# Patient Record
Sex: MALE | Race: OTHER | HISPANIC OR LATINO | Employment: FULL TIME | URBAN - METROPOLITAN AREA
[De-identification: names, ages, dates, MRNs, and addresses within clinical notes are randomized per-mention and may not be internally consistent; named-entity substitution may affect disease eponyms.]

---

## 2018-03-19 ENCOUNTER — APPOINTMENT (OUTPATIENT)
Dept: LAB | Age: 23
End: 2018-03-19
Attending: PREVENTIVE MEDICINE

## 2018-03-19 ENCOUNTER — APPOINTMENT (OUTPATIENT)
Dept: RADIOLOGY | Age: 23
End: 2018-03-19
Attending: PREVENTIVE MEDICINE

## 2018-03-19 ENCOUNTER — TRANSCRIBE ORDERS (OUTPATIENT)
Dept: ADMINISTRATIVE | Age: 23
End: 2018-03-19

## 2018-03-19 DIAGNOSIS — Z02.1 PRE-EMPLOYMENT HEALTH SCREENING EXAMINATION: ICD-10-CM

## 2018-03-19 DIAGNOSIS — Z02.1 PRE-EMPLOYMENT HEALTH SCREENING EXAMINATION: Primary | ICD-10-CM

## 2018-03-19 PROCEDURE — 84202 ASSAY RBC PROTOPORPHYRIN: CPT

## 2018-03-19 PROCEDURE — 71045 X-RAY EXAM CHEST 1 VIEW: CPT

## 2018-03-19 PROCEDURE — 36415 COLL VENOUS BLD VENIPUNCTURE: CPT

## 2018-03-19 PROCEDURE — 86870 RBC ANTIBODY IDENTIFICATION: CPT

## 2018-03-23 LAB
FEP BLD-MCNC: 45 UG/DL (ref 0–100)
LEAD BLD-MCNC: 2 UG/DL (ref 0–19)
ZPP RBC-MCNC: 50 UG/DL (ref 0–100)

## 2019-03-13 ENCOUNTER — APPOINTMENT (OUTPATIENT)
Dept: RADIOLOGY | Age: 24
End: 2019-03-13
Attending: PREVENTIVE MEDICINE

## 2019-03-13 ENCOUNTER — TRANSCRIBE ORDERS (OUTPATIENT)
Dept: ADMINISTRATIVE | Age: 24
End: 2019-03-13

## 2019-03-13 ENCOUNTER — APPOINTMENT (OUTPATIENT)
Dept: LAB | Age: 24
End: 2019-03-13
Attending: PREVENTIVE MEDICINE

## 2019-03-13 DIAGNOSIS — Z00.8 HEALTH EXAMINATION IN POPULATION SURVEY: Primary | ICD-10-CM

## 2019-03-13 DIAGNOSIS — Z00.8 HEALTH EXAMINATION IN POPULATION SURVEY: ICD-10-CM

## 2019-03-13 PROCEDURE — 84202 ASSAY RBC PROTOPORPHYRIN: CPT

## 2019-03-13 PROCEDURE — 71045 X-RAY EXAM CHEST 1 VIEW: CPT

## 2019-03-13 PROCEDURE — 86870 RBC ANTIBODY IDENTIFICATION: CPT

## 2019-03-13 PROCEDURE — 36415 COLL VENOUS BLD VENIPUNCTURE: CPT

## 2019-03-15 LAB
LEAD BLD-MCNC: 4 UG/DL (ref 0–4)
ZPP RBC-MCNC: 42 UG/DL (ref 0–99)

## 2020-06-16 ENCOUNTER — OFFICE VISIT (OUTPATIENT)
Dept: URGENT CARE | Facility: CLINIC | Age: 25
End: 2020-06-16
Payer: OTHER GOVERNMENT

## 2020-06-16 VITALS
WEIGHT: 215 LBS | TEMPERATURE: 98.6 F | RESPIRATION RATE: 16 BRPM | BODY MASS INDEX: 31.84 KG/M2 | HEIGHT: 69 IN | OXYGEN SATURATION: 98 % | HEART RATE: 72 BPM

## 2020-06-16 DIAGNOSIS — R05.9 COUGH: Primary | ICD-10-CM

## 2020-06-16 PROCEDURE — 99213 OFFICE O/P EST LOW 20 MIN: CPT | Performed by: PHYSICIAN ASSISTANT

## 2020-06-16 PROCEDURE — U0003 INFECTIOUS AGENT DETECTION BY NUCLEIC ACID (DNA OR RNA); SEVERE ACUTE RESPIRATORY SYNDROME CORONAVIRUS 2 (SARS-COV-2) (CORONAVIRUS DISEASE [COVID-19]), AMPLIFIED PROBE TECHNIQUE, MAKING USE OF HIGH THROUGHPUT TECHNOLOGIES AS DESCRIBED BY CMS-2020-01-R: HCPCS | Performed by: PHYSICIAN ASSISTANT

## 2020-06-17 LAB — SARS-COV-2 RNA SPEC QL NAA+PROBE: DETECTED

## 2020-06-18 ENCOUNTER — TELEPHONE (OUTPATIENT)
Dept: OTHER | Facility: OTHER | Age: 25
End: 2020-06-18

## 2020-06-19 ENCOUNTER — TELEPHONE (OUTPATIENT)
Dept: URGENT CARE | Facility: CLINIC | Age: 25
End: 2020-06-19

## 2025-07-02 ENCOUNTER — OFFICE VISIT (OUTPATIENT)
Age: 30
End: 2025-07-02

## 2025-07-02 VITALS
HEIGHT: 69 IN | OXYGEN SATURATION: 96 % | RESPIRATION RATE: 18 BRPM | BODY MASS INDEX: 35.56 KG/M2 | SYSTOLIC BLOOD PRESSURE: 144 MMHG | HEART RATE: 90 BPM | WEIGHT: 240.08 LBS | DIASTOLIC BLOOD PRESSURE: 94 MMHG | TEMPERATURE: 97.9 F

## 2025-07-02 DIAGNOSIS — S01.01XA SCALP LACERATION, INITIAL ENCOUNTER: Primary | ICD-10-CM

## 2025-07-02 PROCEDURE — 12002 RPR S/N/AX/GEN/TRNK2.6-7.5CM: CPT

## 2025-07-02 PROCEDURE — 90715 TDAP VACCINE 7 YRS/> IM: CPT

## 2025-07-02 PROCEDURE — 99202 OFFICE O/P NEW SF 15 MIN: CPT

## 2025-07-02 RX ORDER — CEPHALEXIN 500 MG/1
500 CAPSULE ORAL EVERY 8 HOURS SCHEDULED
Qty: 21 CAPSULE | Refills: 0 | Status: SHIPPED | OUTPATIENT
Start: 2025-07-02 | End: 2025-07-09

## 2025-07-02 NOTE — PATIENT INSTRUCTIONS
Thank you for trusting your health with [facility].    Please review the following instructions and return to our clinic or consider an Emergency Department visit for worsening or severe symptoms.      Instructions:     Keep area clean and dry for 24 hours.    Then you can shower but avoid irritating the incision.    Do not pick at or remove staples on your own.    Return in 1 week for a wound check, they are usually removed in 7-10 days.

## 2025-07-02 NOTE — PROGRESS NOTES
[facility]  Name: Jackson Mcgovern      : 1995      MRN: [id number]  Encounter Provider: Nat Squires PA-C  Encounter Date: 2025   Encounter department: [facility] [place]  :  Assessment & Plan  Scalp laceration, initial encounter    Orders:    cephalexin (KEFLEX) 500 mg capsule; Take 1 capsule (500 mg total) by mouth every 8 (eight) hours for 7 days    TDAP VACCINE GREATER THAN OR EQUAL TO 6YO IM      Keflex sent due to depth of wound and prevention of infection development. TDAP given in office. 6 staples placed to close laceration without difficulty.  Advised pt to return in 7 days for a wound check.    Laceration repair    Date/Time: 2025 5:25 PM    Performed by: Nat Squires PA-C  Authorized by: Nat Squires PA-C  Body area: head/neck  Location details: scalp  Laceration length: 7 cm  Foreign bodies: no foreign bodies  Tendon involvement: none    Wound Dehiscence:  Superficial Wound Dehiscence: simple closure      Procedure Details:  Irrigation solution: saline  Irrigation method: syringe  Skin closure: staples  Number of sutures: 6  Approximation: close  Approximation difficulty: simple  Patient tolerance: patient tolerated the procedure well with no immediate complications          Patient Instructions  Follow up with PCP in 3-5 days.  Proceed to  ER if symptoms worsen.    If tests are performed, our office will contact you with results only if changes need to made to the care plan discussed with you at the visit. [facility]    Chief Complaint:   Chief Complaint   Patient presents with    Head Laceration     About 2 hours ago hit head on edge of table about one inch above hair line on forehead. Took shower, still bleeding slightly. Denies headache, no loss of consciousness.      History of Present Illness   31 yo M with head alceration that occurred about 2 hours ago after he hit his head on edge of  "table about one inch above hair line on forehead. Took shower, still bleeding slightly. Denies headache, no loss of consciousness.       Head Laceration  Pertinent negatives include no headaches or weakness.         Review of Systems   Eyes:  Negative for photophobia.   Skin:  Positive for wound.   Neurological:  Negative for dizziness, weakness and headaches.     Past Medical History   Past Medical History[1]  Past Surgical History[2]  Family History[3]  he reports that he has been smoking cigarettes. He has never used smokeless tobacco. He reports that he does not drink alcohol and does not use drugs.  Current Outpatient Medications   Medication Instructions    cephalexin (KEFLEX) 500 mg, Oral, Every 8 hours scheduled   Allergies[4]     Objective   /94 (Patient Position: Sitting)   Pulse 90   Temp 97.9 °F (36.6 °C)   Resp 18   Ht 5' 9\" (1.753 m)   Wt 109 kg (240 lb 1.3 oz)   SpO2 96%   BMI 35.45 kg/m²      Physical Exam  Constitutional:       Appearance: Normal appearance.   HENT:      Head: Normocephalic.      Comments: 7 cm laceration about 1 inch back on hairline directly across frontal scalp.    Cardiovascular:      Rate and Rhythm: Normal rate.   Pulmonary:      Effort: Pulmonary effort is normal.     Musculoskeletal:         General: Swelling and tenderness present.     Skin:     General: Skin is warm.     Neurological:      General: No focal deficit present.      Mental Status: He is alert and oriented to person, place, and time.      Motor: No weakness.     Psychiatric:         Mood and Affect: Mood normal.         Portions of the record may have been created with voice recognition software.  Occasional wrong word or \"sound a like\" substitutions may have occurred due to the inherent limitations of voice recognition software.  Read the chart carefully and recognize, using context, where substitutions have occurred.         [1]   Past Medical History:  Diagnosis Date    Known health problems: " none    [2]   Past Surgical History:  Procedure Laterality Date    NO PAST SURGERIES     [3]   Family History  Problem Relation Name Age of Onset    No Known Problems Mother      No Known Problems Father     [4] No Known Allergies

## 2025-07-09 ENCOUNTER — OFFICE VISIT (OUTPATIENT)
Age: 30
End: 2025-07-09

## 2025-07-09 VITALS
DIASTOLIC BLOOD PRESSURE: 54 MMHG | RESPIRATION RATE: 18 BRPM | SYSTOLIC BLOOD PRESSURE: 140 MMHG | TEMPERATURE: 97.9 F | OXYGEN SATURATION: 96 % | HEART RATE: 80 BPM

## 2025-07-09 DIAGNOSIS — S01.01XD LACERATION WITHOUT FOREIGN BODY OF SCALP, SUBSEQUENT ENCOUNTER: Primary | ICD-10-CM

## 2025-07-09 PROCEDURE — 99212 OFFICE O/P EST SF 10 MIN: CPT | Performed by: EMERGENCY MEDICINE

## 2025-07-09 NOTE — PROGRESS NOTES
[facility]        NAME: Jackson Mcgovern is a 30 y.o. male  : 1995    MRN: [id number]  DATE: 2025  TIME: 6:41 PM    Assessment and Plan   Laceration without foreign body of scalp, subsequent encounter [S01.01XD]  1. Laceration without foreign body of scalp, subsequent encounter              Patient Instructions     Patient Instructions   Patient Education     Removing staples   The Basics   Written by the doctors and editors [facility]   What are staples? -- Staples are a way doctors can close certain types of cuts. Staples that go into the body are different from those used on paper. To put staples in, doctors use a special stapler (figure 1). Staples need to be taken out after a certain amount of time.  How are staples removed? -- Staples are removed by a doctor or nurse. Usually, staples are taken out 7 to 10 days after they were put in, depending on where they are.  Staples are taken out with a special staple remover. But doctors' offices don't always have this device. The doctor who puts in your staples might give you a staple remover. If so, bring it to your doctor's office when you have your staples taken out.  When it is time for your staples to be removed, the doctor or nurse will check your cut to make sure that it is healing well.  To remove each staple, the doctor or nurse will:   Insert the end of the staple remover under the staple   Use the tool to open the staple and pull it up out of the skin  To some people, having staples removed feels like pinching. But it should not be painful.  When all of the staples are out, the doctor or nurse will clean the cut. They might put a bandage, dressing, or small sticky strips over the area to protect it.  How do I care for myself at home? -- Ask the doctor or nurse what you should do when you go home. Make sure that you understand exactly what you need to do to care for yourself. Ask questions if there is anything you do not  understand.  You should also:   If you have small sticky strips on your cut, leave them on until they fall off, or until your doctor or nurse says to take them off.   Wash your hands before and after touching your cut.   Follow your doctor or nurse's instructions about:   When you should clean the wound and how to change your bandage or dressing, if needed   Physical activity   Take care of your scar:   Protect the area from the sun - Always use sunscreen or wear clothes or a hat that cover the scar.   Your doctor or nurse might also recommend using a special lotion or cream to help your scar heal.  When should I call the doctor? -- Call for advice if:   You have signs of infection - These include:   Swelling, redness, or warmth around the cut   Yellow, green, or bloody discharge from the cut   Bad smell coming from the cut   The cut becomes painful   Your cut opens up again.  All topics are updated as new evidence becomes available and our peer review process is complete.  This topic retrieved from [employer] on: Apr 11, 2024.  Topic 541701 Version 1.0  Release: 32.3.2 - C32.100  © 2024 UpToDate, Inc. and/or its affiliates. All rights reserved.  figure 1: Staples to close a cut     Sometimes, doctors use staples to close a cut. These staples are different from the staples used in paper. To put them in, doctors use a special stapler. Staples need to be taken out by a doctor or nurse after a certain amount of time.  Graphic 36451 Version 3.0  Consumer Information Use and Disclaimer   Disclaimer: This generalized information is a limited summary of diagnosis, treatment, and/or medication information. It is not meant to be comprehensive and should be used as a tool to help the user understand and/or assess potential diagnostic and treatment options. It does NOT include all information about conditions, treatments, medications, side effects, or risks that may apply to a specific patient. It is not intended to be medical  advice or a substitute for the medical advice, diagnosis, or treatment of a health care provider based on the health care provider's examination and assessment of a patient's specific and unique circumstances. Patients must speak with a health care provider for complete information about their health, medical questions, and treatment options, including any risks or benefits regarding use of medications. This information does not endorse any treatments or medications as safe, effective, or approved for treating a specific patient. UpToDate, Inc. and its affiliates disclaim any warranty or liability relating to this information or the use thereof.The use of this information is governed by the Terms of Use, available at https://www.[employer]/en/know/clinical-effectiveness-terms. 2024© UpToDate, Inc. and its affiliates and/or licensors. All rights reserved.  Copyright   © 2024 UpToDate, Inc. and/or its affiliates. All rights reserved.      Follow up with PCP in 3-5 days.  Proceed to  ER if symptoms worsen.    Chief Complaint     Chief Complaint   Patient presents with    Wound Check     Wound check on scalp where 6 staples were placed on 7/2/25. No issues reported per patient.          History of Present Illness       Patient here for wound check 7 days after repair.  No complaints.    Wound Check        Review of Systems   Review of Systems   Constitutional:  Negative for fever.   Skin:  Positive for wound. Negative for color change and rash.         Current Medications     Current Medications[1]    Current Allergies     Allergies as of 07/09/2025    (No Known Allergies)            The following portions of the patient's history were reviewed and updated as appropriate: allergies, current medications, past family history, past medical history, past social history, past surgical history and problem list.     Past Medical History[2]    Past Surgical History[3]    Family History[4]      Medications have been  verified.        Objective   /54 (Patient Position: Sitting)   Pulse 80   Temp 97.9 °F (36.6 °C)   Resp 18   SpO2 96%        Physical Exam     Physical Exam  Vitals and nursing note reviewed.   Constitutional:       General: He is not in acute distress.     Appearance: He is well-developed.     Musculoskeletal:      Cervical back: Neck supple.     Skin:     General: Skin is warm and dry.      Findings: No erythema or rash.     Neurological:      Mental Status: He is alert and oriented to person, place, and time.                        [1]   Current Outpatient Medications:     cephalexin (KEFLEX) 500 mg capsule, Take 1 capsule (500 mg total) by mouth every 8 (eight) hours for 7 days, Disp: 21 capsule, Rfl: 0  [2]   Past Medical History:  Diagnosis Date    Known health problems: none    [3]   Past Surgical History:  Procedure Laterality Date    NO PAST SURGERIES     [4]   Family History  Problem Relation Name Age of Onset    No Known Problems Mother      No Known Problems Father

## 2025-07-09 NOTE — PATIENT INSTRUCTIONS
Patient Education     Removing staples   The Basics   Written by the doctors and editors at [facility]   What are staples? -- Staples are a way doctors can close certain types of cuts. Staples that go into the body are different from those used on paper. To put staples in, doctors use a special stapler (figure 1). Staples need to be taken out after a certain amount of time.  How are staples removed? -- Staples are removed by a doctor or nurse. Usually, staples are taken out 7 to 10 days after they were put in, depending on where they are.  Staples are taken out with a special staple remover. But doctors' offices don't always have this device. The doctor who puts in your staples might give you a staple remover. If so, bring it to your doctor's office when you have your staples taken out.  When it is time for your staples to be removed, the doctor or nurse will check your cut to make sure that it is healing well.  To remove each staple, the doctor or nurse will:   Insert the end of the staple remover under the staple   Use the tool to open the staple and pull it up out of the skin  To some people, having staples removed feels like pinching. But it should not be painful.  When all of the staples are out, the doctor or nurse will clean the cut. They might put a bandage, dressing, or small sticky strips over the area to protect it.  How do I care for myself at home? -- Ask the doctor or nurse what you should do when you go home. Make sure that you understand exactly what you need to do to care for yourself. Ask questions if there is anything you do not understand.  You should also:   If you have small sticky strips on your cut, leave them on until they fall off, or until your doctor or nurse says to take them off.   Wash your hands before and after touching your cut.   Follow your doctor or nurse's instructions about:   When you should clean the wound and how to change your bandage or dressing, if needed   Physical  activity   Take care of your scar:   Protect the area from the sun - Always use sunscreen or wear clothes or a hat that cover the scar.   Your doctor or nurse might also recommend using a special lotion or cream to help your scar heal.  When should I call the doctor? -- Call for advice if:   You have signs of infection - These include:   Swelling, redness, or warmth around the cut   Yellow, green, or bloody discharge from the cut   Bad smell coming from the cut   The cut becomes painful   Your cut opens up again.  All topics are updated as new evidence becomes available and our peer review process is complete.  This topic retrieved from [employer] on: Apr 11, 2024.  Topic 790699 Version 1.0  Release: 32.3.2 - C32.100  © 2024 UpToDate, Inc. and/or its affiliates. All rights reserved.  figure 1: Staples to close a cut     Sometimes, doctors use staples to close a cut. These staples are different from the staples used in paper. To put them in, doctors use a special stapler. Staples need to be taken out by a doctor or nurse after a certain amount of time.  Graphic 53489 Version 3.0  Consumer Information Use and Disclaimer   Disclaimer: This generalized information is a limited summary of diagnosis, treatment, and/or medication information. It is not meant to be comprehensive and should be used as a tool to help the user understand and/or assess potential diagnostic and treatment options. It does NOT include all information about conditions, treatments, medications, side effects, or risks that may apply to a specific patient. It is not intended to be medical advice or a substitute for the medical advice, diagnosis, or treatment of a health care provider based on the health care provider's examination and assessment of a patient's specific and unique circumstances. Patients must speak with a health care provider for complete information about their health, medical questions, and treatment options, including any risks or  benefits regarding use of medications. This information does not endorse any treatments or medications as safe, effective, or approved for treating a specific patient. UpToDate, Inc. and its affiliates disclaim any warranty or liability relating to this information or the use thereof.The use of this information is governed by the Terms of Use, available at https://www.[employer].com/en/know/clinical-effectiveness-terms. 2024© UpToDate, Inc. and its affiliates and/or licensors. All rights reserved.  Copyright   © 2024 UpToDate, Inc. and/or its affiliates. All rights reserved.

## 2025-07-12 ENCOUNTER — OFFICE VISIT (OUTPATIENT)
Age: 30
End: 2025-07-12

## 2025-07-12 VITALS
OXYGEN SATURATION: 96 % | SYSTOLIC BLOOD PRESSURE: 122 MMHG | DIASTOLIC BLOOD PRESSURE: 60 MMHG | RESPIRATION RATE: 16 BRPM | HEART RATE: 68 BPM | TEMPERATURE: 96.6 F

## 2025-07-12 DIAGNOSIS — Z48.02 ENCOUNTER FOR STAPLE REMOVAL: Primary | ICD-10-CM

## 2025-07-12 PROCEDURE — 99213 OFFICE O/P EST LOW 20 MIN: CPT

## 2025-07-12 NOTE — PROGRESS NOTES
[facility]        NAME: Jackson Mcgovern is a 30 y.o. male  : 1995    MRN: [id number]  DATE: 2025  TIME: 12:10 PM    /60 (Patient Position: Sitting)   Pulse 68   Temp (!) 96.6 °F (35.9 °C) (Tympanic)   Resp 16   SpO2 96%     Assessment and Plan   Encounter for staple removal [Z48.02]  1. Encounter for staple removal              Patient Instructions       Follow up with PCP in 3-5 days.  Proceed to  ER if symptoms worsen.    Chief Complaint     Chief Complaint   Patient presents with    Suture / Staple Removal     Here for staple removal in scalp. No issues reported.          History of Present Illness       Pt for scalp staple removal, pt with no complaints     Suture / Staple Removal        Review of Systems   Review of Systems   Constitutional: Negative.    HENT: Negative.     Eyes: Negative.    Respiratory: Negative.     Cardiovascular: Negative.    Gastrointestinal: Negative.    Endocrine: Negative.    Genitourinary: Negative.    Musculoskeletal: Negative.    Skin: Negative.    Allergic/Immunologic: Negative.    Neurological: Negative.    Hematological: Negative.    Psychiatric/Behavioral: Negative.     All other systems reviewed and are negative.        Current Medications     Current Medications[1]    Current Allergies     Allergies as of 2025    (No Known Allergies)            The following portions of the patient's history were reviewed and updated as appropriate: allergies, current medications, past family history, past medical history, past social history, past surgical history and problem list.     Past Medical History[2]    Past Surgical History[3]    Family History[4]      Medications have been verified.        Objective   /60 (Patient Position: Sitting)   Pulse 68   Temp (!) 96.6 °F (35.9 °C) (Tympanic)   Resp 16   SpO2 96%        Physical Exam     Physical Exam  Vitals and nursing note reviewed.   Constitutional:       Appearance: Normal  appearance. He is normal weight.     Cardiovascular:      Rate and Rhythm: Normal rate and regular rhythm.      Pulses: Normal pulses.      Heart sounds: Normal heart sounds.   Pulmonary:      Breath sounds: Normal breath sounds.     Musculoskeletal:         General: Normal range of motion.     Skin:     General: Skin is warm.      Comments: Scalp staples removed, no erythema no swelling no tenderness wound closed      Neurological:      Mental Status: He is alert and oriented to person, place, and time.                          [1] No current outpatient medications on file.  [2]   Past Medical History:  Diagnosis Date    Known health problems: none    [3]   Past Surgical History:  Procedure Laterality Date    NO PAST SURGERIES     [4]   Family History  Problem Relation Name Age of Onset    No Known Problems Mother      No Known Problems Father